# Patient Record
Sex: FEMALE | Race: WHITE | NOT HISPANIC OR LATINO
[De-identification: names, ages, dates, MRNs, and addresses within clinical notes are randomized per-mention and may not be internally consistent; named-entity substitution may affect disease eponyms.]

---

## 2021-04-15 ENCOUNTER — APPOINTMENT (OUTPATIENT)
Dept: HEMATOLOGY ONCOLOGY | Facility: CLINIC | Age: 61
End: 2021-04-15
Payer: COMMERCIAL

## 2021-04-15 VITALS — WEIGHT: 144 LBS

## 2021-04-15 DIAGNOSIS — F31.60 BIPOLAR DISORDER, CURRENT EPISODE MIXED, UNSPECIFIED: ICD-10-CM

## 2021-04-15 DIAGNOSIS — Z86.39 PERSONAL HISTORY OF OTHER ENDOCRINE, NUTRITIONAL AND METABOLIC DISEASE: ICD-10-CM

## 2021-04-15 DIAGNOSIS — Z80.8 FAMILY HISTORY OF MALIGNANT NEOPLASM OF OTHER ORGANS OR SYSTEMS: ICD-10-CM

## 2021-04-15 DIAGNOSIS — M19.90 UNSPECIFIED OSTEOARTHRITIS, UNSPECIFIED SITE: ICD-10-CM

## 2021-04-15 PROCEDURE — 99204 OFFICE O/P NEW MOD 45 MIN: CPT | Mod: 95

## 2021-04-15 RX ORDER — LAMOTRIGINE 200 MG/1
200 TABLET ORAL
Refills: 0 | Status: ACTIVE | COMMUNITY

## 2021-04-15 RX ORDER — LURASIDONE HYDROCHLORIDE 20 MG/1
20 TABLET, FILM COATED ORAL
Refills: 0 | Status: ACTIVE | COMMUNITY

## 2021-04-15 NOTE — REASON FOR VISIT
[Home] : at home, [unfilled] , at the time of the visit. [Medical Office: ([facility])___] : at the medical office located in  [Verbal consent obtained from patient] : the patient, [unfilled] [Initial Consultation] : an initial consultation for [CLL] : chronic lymphocytic leukemia [Spouse] : spouse

## 2021-04-15 NOTE — HISTORY OF PRESENT ILLNESS
[Disease:__________________________] : Disease: [unfilled] [de-identified] : Nery Darling is a 59 yo female with newly diagnosed CLL who presents today for initial consultation. \par Labs done 11/2020 showed an elevated WBC of 14.5k, ALC of 9.0, Hgb at 14.4, plt at 354. She followed up with Dr. Figueroa who sent out peripheral blood for flow cytometry which showed CD5+, CD23+, CD38- B cell clonal population of 2% with dim kappa light chain restriction. FISH was positive for del 13 q. 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 No other LAD noted, no splenomegaly. We discussed with pt that the enlarged LAD is from the COVID vaccine.\par \par Pt denies fevers/chills, unintended weight loss, abdominal pain, N/V/D or bothersome LAD. She notes occasional drenching night sweats. Energy level is good.  [de-identified] : Del 13 q\par IGVH mutated\par Neg for TP53 mutation [FreeTextEntry1] : Treatment Naive

## 2021-04-15 NOTE — ASSESSMENT
[FreeTextEntry1] : Nery Darling is a 61 yo female with newly diagnosed CLL who presents today for initial consultation. \par Labs done with PCP on 11/2020 showed an elevated WBC of 14.5k, ALC of 9.0, Hgb at 14.4, plt at 354. She followed up with Dr. Figueroa who sent out peripheral blood for flow cytometry which showed CD5+, CD23+, CD38- B cell clonal population of 2% with dim kappa light chain restriction. FISH was positive for del 13 q. 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 We explained that while CLL in not curable, it is a chronic condition and therefore we do not treat it unless clinically indicated. We discussed that at this time we would recommend active survelliance, as the patient not does not have any symptoms or concerning anemia or thrombocytopenia. We went over the indications for starting therapy including bulky LAD, splenomegaly, rapidly increasing WBC, anemia, thrombocytopenia or any constitutional complaints. \par \par We have advised the patient to continue with routine health maintenance and age appropriate screenings such as mammogram/pap smear, colonoscopy, dermatology evaluation and yearly flu vaccine. We have explained she should avoid all live vaccines. We have discussed the increase risk of infection with CLL and that the patient should alert any treating providers of this of this diagnosis. \par \par Pt should continue follow with Dr. Perez locally in [place]. We can see her as needed.\par \par \par Pt seen with Dr. Camargo

## 2021-04-15 NOTE — RESULTS/DATA
[FreeTextEntry1] : CBC reviewed from 2/11/21\par wbc - 15.9\par hgb - 13.5\par plt - 328\par alc - 9.7\par anc - 5.4

## 2021-04-15 NOTE — PHYSICAL EXAM
[Fully active, able to carry on all pre-disease performance without restriction] : Status 0 - Fully active, able to carry on all pre-disease performance without restriction [Normal] : affect appropriate [de-identified] : normal respiration, speaking in full sentences [de-identified] : AAOX3

## 2021-04-26 DIAGNOSIS — C91.10 CHRONIC LYMPHOCYTIC LEUKEMIA OF B-CELL TYPE NOT HAVING ACHIEVED REMISSION: ICD-10-CM

## 2024-01-12 ENCOUNTER — OUTPATIENT (OUTPATIENT)
Dept: OUTPATIENT SERVICES | Facility: HOSPITAL | Age: 64
LOS: 1 days | Discharge: HOME | End: 2024-01-12

## 2024-01-12 DIAGNOSIS — C50.919 MALIGNANT NEOPLASM OF UNSPECIFIED SITE OF UNSPECIFIED FEMALE BREAST: ICD-10-CM

## 2024-01-17 ENCOUNTER — TRANSCRIPTION ENCOUNTER (OUTPATIENT)
Age: 64
End: 2024-01-17

## 2024-01-17 ENCOUNTER — APPOINTMENT (OUTPATIENT)
Dept: HEMATOLOGY ONCOLOGY | Facility: CLINIC | Age: 64
End: 2024-01-17
Payer: COMMERCIAL

## 2024-01-17 PROCEDURE — 99213 OFFICE O/P EST LOW 20 MIN: CPT | Mod: 95

## 2024-01-19 NOTE — PHYSICAL EXAM
[Fully active, able to carry on all pre-disease performance without restriction] : Status 0 - Fully active, able to carry on all pre-disease performance without restriction [Normal] : affect appropriate [de-identified] : normal respiration, speaking in full sentences [de-identified] : AAOX3

## 2024-01-19 NOTE — HISTORY OF PRESENT ILLNESS
[Disease:__________________________] : Disease: [unfilled] [de-identified] : Nery Darling is a 59 yo female with newly diagnosed CLL who presents today for initial consultation.  Labs done 11/2020 showed an elevated WBC of 14.5k, ALC of 9.0, Hgb at 14.4, plt at 354. She followed up with Dr. Figueroa who sent out peripheral blood for flow cytometry which showed CD5+, CD23+, CD38- B cell clonal population of 2% with dim kappa light chain restriction. FISH was positive for del 13 q. 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 No other LAD noted, no splenomegaly. We discussed with pt that the enlarged LAD is from the COVID vaccine.  Pt denies fevers/chills, unintended weight loss, abdominal pain, N/V/D or bothersome LAD. She notes occasional drenching night sweats. Energy level is good.   1/17/24: Pt returns for follow up after almost 3 years. She still lives in [place]. SHe reports she is concerned because her WBC is continually rising and her hematologist does not recommend any treatment at this point. SHe reports she is feeling well but her  is very ill. No B symptoms. Denies frequent or recurrent infections. No weight loss. No new or worsening LAD.  [de-identified] : Del 13 q\par  IGVH mutated\par  Neg for TP53 mutation [FreeTextEntry1] : Treatment Naive

## 2024-01-19 NOTE — ASSESSMENT
[FreeTextEntry1] : Nery Darling is a 59 yo female with newly diagnosed CLL who presents today for initial consultation.  Labs done with PCP on 11/2020 showed an elevated WBC of 14.5k, ALC of 9.0, Hgb at 14.4, plt at 354. She followed up with Dr. Figueroa who sent out peripheral blood for flow cytometry which showed CD5+, CD23+, CD38- B cell clonal population of 2% with dim kappa light chain restriction. FISH was positive for del 13 q. 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  We had a long discussion with the patient about the clinical nature of CLL. We explained that while CLL in not curable, it is a chronic condition and therefore we do not treat it unless clinically indicated. We discussed that at this time we would agree with and recommend active survelliance, as the patient not does not have any symptoms or concerning anemia or thrombocytopenia. We went over the indications for starting therapy including bulky LAD, splenomegaly, rapidly increasing WBC, anemia, thrombocytopenia or any constitutional complaints.  Recent lab work done with her primary hematologist reviewed and although WBC is elevated, all other indices are WNL. Pt was reassured that she is unlikely to require treatment in the near future but that she should continue to follow up with her MD  We have advised the patient to continue with routine health maintenance and age appropriate screenings such as mammogram/pap smear, colonoscopy, dermatology evaluation and yearly flu vaccine. We have explained she should avoid all live vaccines. We have discussed the increase risk of infection with CLL and that the patient should alert any treating providers of this of this diagnosis.    Pt should continue follow with Dr. Perez locally in [place]. We can see her as needed.

## 2024-01-19 NOTE — REASON FOR VISIT
[Follow-Up Visit] : a follow-up visit for [CLL] : chronic lymphocytic leukemia [Home] : at home, [unfilled] , at the time of the visit. [Medical Office: ([facility])___] : at the medical office located in  [Verbal consent obtained from patient] : the patient, [unfilled]